# Patient Record
Sex: FEMALE | Race: BLACK OR AFRICAN AMERICAN | NOT HISPANIC OR LATINO | Employment: UNEMPLOYED | URBAN - METROPOLITAN AREA
[De-identification: names, ages, dates, MRNs, and addresses within clinical notes are randomized per-mention and may not be internally consistent; named-entity substitution may affect disease eponyms.]

---

## 2020-01-09 PROBLEM — G47.33 OSA (OBSTRUCTIVE SLEEP APNEA): Status: ACTIVE | Noted: 2020-01-09

## 2020-01-09 PROBLEM — G47.411 NARCOLEPSY WITH CATAPLEXY: Status: ACTIVE | Noted: 2020-01-09

## 2020-04-17 ENCOUNTER — SOCIAL WORK (OUTPATIENT)
Dept: ADMINISTRATIVE | Facility: OTHER | Age: 25
End: 2020-04-17

## 2020-04-17 NOTE — PROGRESS NOTES
SW met with pt regarding initial OB assessment. Pt stated this is her 3rd pregnancy/0-miscarriage. Pt stated lives alone with her children-5,1 and able to perform ADL's independently. Pt stated support system is her mother.Edwardo. Pt stated has medicaid([facility]). Pt stated does not have WIC. SW provide pt with information on other community resources.SW faxed and scanned pt's notification of pregnancy into epic.  No other needs identified at this time.    Renay Salazar,MSW  Pager#[phone]

## 2020-04-29 PROBLEM — O09.91 SUPERVISION OF HIGH RISK PREGNANCY IN FIRST TRIMESTER: Status: ACTIVE | Noted: 2020-04-29

## 2020-04-29 PROBLEM — O10.919 CHRONIC HYPERTENSION AFFECTING PREGNANCY: Status: ACTIVE | Noted: 2020-04-29

## 2020-07-01 PROBLEM — E66.01 CLASS 2 SEVERE OBESITY DUE TO EXCESS CALORIES WITH SERIOUS COMORBIDITY AND BODY MASS INDEX (BMI) OF 36.0 TO 36.9 IN ADULT: Status: ACTIVE | Noted: 2020-07-01

## 2020-07-01 PROBLEM — G47.411 CATAPLEXY: Status: ACTIVE | Noted: 2020-07-01

## 2020-09-10 PROBLEM — Z91.199 NONCOMPLIANCE WITH CPAP TREATMENT: Status: ACTIVE | Noted: 2018-12-27

## 2020-09-10 PROBLEM — O09.299 HISTORY OF PRE-ECLAMPSIA IN PRIOR PREGNANCY, CURRENTLY PREGNANT: Status: ACTIVE | Noted: 2018-07-17

## 2020-09-10 PROBLEM — Z98.891 HISTORY OF UTERINE SCAR FROM PREVIOUS SURGERY: Status: ACTIVE | Noted: 2018-07-17

## 2020-09-10 PROBLEM — E66.9 OBESITY: Status: ACTIVE | Noted: 2018-07-17

## 2021-01-29 PROBLEM — J35.1 TONSILLAR HYPERTROPHY: Status: ACTIVE | Noted: 2021-01-29

## 2021-08-23 PROBLEM — O09.299 HISTORY OF PRE-ECLAMPSIA IN PRIOR PREGNANCY, CURRENTLY PREGNANT: Status: RESOLVED | Noted: 2018-07-17 | Resolved: 2021-08-23

## 2021-08-25 PROBLEM — E66.01 CLASS 2 SEVERE OBESITY DUE TO EXCESS CALORIES WITH SERIOUS COMORBIDITY AND BODY MASS INDEX (BMI) OF 36.0 TO 36.9 IN ADULT: Status: RESOLVED | Noted: 2020-07-01 | Resolved: 2021-08-25

## 2021-08-25 PROBLEM — D64.9 ANEMIA: Status: ACTIVE | Noted: 2021-08-25

## 2021-08-25 PROBLEM — G47.411 CATAPLEXY: Status: RESOLVED | Noted: 2020-07-01 | Resolved: 2021-08-25

## 2021-08-25 PROBLEM — F51.12 INSUFFICIENT SLEEP SYNDROME: Status: ACTIVE | Noted: 2021-08-25

## 2021-08-25 PROBLEM — N20.0 KIDNEY STONE: Status: ACTIVE | Noted: 2021-08-25

## 2021-08-25 PROBLEM — E04.9 ENLARGED THYROID: Status: ACTIVE | Noted: 2021-08-25

## 2021-11-28 PROBLEM — O09.299 HX MATERNAL GBS (GROUP B STREPTOCOCCUS) AFFECTED NEONATE, PREGNANT, UNSPECIFIED TRIMESTER: Status: ACTIVE | Noted: 2019-01-07

## 2021-11-28 PROBLEM — O99.820 GBS (GROUP B STREPTOCOCCUS CARRIER), +RV CULTURE, CURRENTLY PREGNANT: Status: ACTIVE | Noted: 2019-01-07

## 2021-11-28 PROBLEM — E55.9 VITAMIN D DEFICIENCY: Status: ACTIVE | Noted: 2021-11-28

## 2021-11-28 PROBLEM — E61.1 IRON DEFICIENCY: Status: ACTIVE | Noted: 2021-11-28

## 2021-11-28 PROBLEM — E53.1 VITAMIN B6 DEFICIENCY: Status: ACTIVE | Noted: 2021-11-28

## 2021-12-27 PROBLEM — R51.9 NONINTRACTABLE HEADACHE: Status: ACTIVE | Noted: 2021-12-27

## 2022-11-07 PROBLEM — O09.299 HX MATERNAL GBS (GROUP B STREPTOCOCCUS) AFFECTED NEONATE, PREGNANT, UNSPECIFIED TRIMESTER: Status: RESOLVED | Noted: 2019-01-07 | Resolved: 2022-11-07

## 2023-05-30 PROBLEM — G43.909 MIGRAINE WITHOUT STATUS MIGRAINOSUS, NOT INTRACTABLE: Status: ACTIVE | Noted: 2023-05-30

## 2023-05-30 PROBLEM — Z79.899 ENCOUNTER FOR LONG-TERM CURRENT USE OF MEDICATION: Status: ACTIVE | Noted: 2023-05-30

## 2023-05-30 PROBLEM — Z00.00 HEALTHCARE MAINTENANCE: Status: ACTIVE | Noted: 2023-05-30

## 2023-06-09 PROBLEM — G47.30 SLEEP-DISORDERED BREATHING: Status: ACTIVE | Noted: 2023-06-09

## 2023-06-09 PROBLEM — G47.8 UPPER AIRWAY RESISTANCE SYNDROME: Status: ACTIVE | Noted: 2023-06-09

## 2023-06-13 ENCOUNTER — PATIENT MESSAGE (OUTPATIENT)
Dept: RESEARCH | Facility: HOSPITAL | Age: 28
End: 2023-06-13
Payer: MEDICAID

## 2023-06-27 ENCOUNTER — PATIENT MESSAGE (OUTPATIENT)
Dept: RESEARCH | Facility: HOSPITAL | Age: 28
End: 2023-06-27
Payer: MEDICAID

## 2023-07-05 ENCOUNTER — PATIENT MESSAGE (OUTPATIENT)
Dept: RESEARCH | Facility: HOSPITAL | Age: 28
End: 2023-07-05
Payer: MEDICAID

## 2023-07-11 ENCOUNTER — PATIENT MESSAGE (OUTPATIENT)
Dept: RESEARCH | Facility: HOSPITAL | Age: 28
End: 2023-07-11
Payer: MEDICAID

## 2023-07-19 ENCOUNTER — PATIENT MESSAGE (OUTPATIENT)
Dept: RESEARCH | Facility: HOSPITAL | Age: 28
End: 2023-07-19
Payer: MEDICAID

## 2023-08-02 PROBLEM — R06.83 PRIMARY SNORING: Status: ACTIVE | Noted: 2023-08-02

## 2023-08-02 PROBLEM — G47.26 SHIFT WORK SLEEP DISORDER: Status: ACTIVE | Noted: 2023-08-02

## 2023-08-02 PROBLEM — F32.1 MODERATE MAJOR DEPRESSION: Status: ACTIVE | Noted: 2023-08-02

## 2023-08-17 PROBLEM — I10 HYPERTENSION: Status: ACTIVE | Noted: 2023-08-17

## 2023-09-04 PROBLEM — Z00.00 PREVENTATIVE HEALTH CARE: Status: RESOLVED | Noted: 2023-05-30 | Resolved: 2023-09-04

## 2023-09-12 PROBLEM — N89.8 VAGINAL DISCHARGE: Status: ACTIVE | Noted: 2023-09-12

## 2023-09-12 PROBLEM — Z91.89 AT RISK FOR SEXUALLY TRANSMITTED DISEASE DUE TO UNPROTECTED SEX: Status: ACTIVE | Noted: 2023-09-12

## 2023-09-28 PROBLEM — J02.9 SORE THROAT: Status: ACTIVE | Noted: 2023-09-28

## 2023-09-28 PROBLEM — H92.02 LEFT EAR PAIN: Status: ACTIVE | Noted: 2023-09-28

## 2023-09-28 PROBLEM — Z90.89 STATUS POST TONSILLECTOMY: Status: ACTIVE | Noted: 2023-09-28

## 2023-09-28 PROBLEM — H66.002 ACUTE SUPPURATIVE OTITIS MEDIA OF LEFT EAR WITHOUT SPONTANEOUS RUPTURE OF TYMPANIC MEMBRANE: Status: ACTIVE | Noted: 2023-09-28

## 2023-11-13 ENCOUNTER — PATIENT MESSAGE (OUTPATIENT)
Dept: ADMINISTRATIVE | Facility: HOSPITAL | Age: 28
End: 2023-11-13
Payer: MEDICAID

## 2024-01-30 PROBLEM — Z09 FOLLOW-UP EXAM: Status: ACTIVE | Noted: 2024-01-30

## 2024-02-04 PROBLEM — R10.9 ABDOMINAL PAIN AFFECTING PREGNANCY: Status: ACTIVE | Noted: 2024-02-04

## 2024-02-04 PROBLEM — O26.899 ABDOMINAL PAIN AFFECTING PREGNANCY: Status: ACTIVE | Noted: 2024-02-04

## 2024-03-25 PROBLEM — D50.9 MATERNAL IRON DEFICIENCY ANEMIA COMPLICATING PREGNANCY IN SECOND TRIMESTER: Status: ACTIVE | Noted: 2024-03-25

## 2024-03-25 PROBLEM — O99.012 MATERNAL IRON DEFICIENCY ANEMIA COMPLICATING PREGNANCY IN SECOND TRIMESTER: Status: ACTIVE | Noted: 2024-03-25
